# Patient Record
Sex: MALE | Employment: FULL TIME | URBAN - METROPOLITAN AREA
[De-identification: names, ages, dates, MRNs, and addresses within clinical notes are randomized per-mention and may not be internally consistent; named-entity substitution may affect disease eponyms.]

---

## 2024-04-12 ENCOUNTER — OFFICE VISIT (OUTPATIENT)
Dept: PRIMARY CARE | Facility: CLINIC | Age: 44
End: 2024-04-12
Payer: COMMERCIAL

## 2024-04-12 VITALS
OXYGEN SATURATION: 97 % | SYSTOLIC BLOOD PRESSURE: 128 MMHG | BODY MASS INDEX: 32.13 KG/M2 | DIASTOLIC BLOOD PRESSURE: 80 MMHG | RESPIRATION RATE: 16 BRPM | WEIGHT: 212 LBS | HEART RATE: 70 BPM | TEMPERATURE: 97.7 F | HEIGHT: 68 IN

## 2024-04-12 DIAGNOSIS — E03.9 HYPOTHYROIDISM, UNSPECIFIED TYPE: ICD-10-CM

## 2024-04-12 DIAGNOSIS — E78.2 MIXED HYPERLIPIDEMIA: Primary | ICD-10-CM

## 2024-04-12 PROCEDURE — 99214 OFFICE O/P EST MOD 30 MIN: CPT | Performed by: INTERNAL MEDICINE

## 2024-04-12 RX ORDER — VALSARTAN AND HYDROCHLOROTHIAZIDE 80; 12.5 MG/1; MG/1
1 TABLET, FILM COATED ORAL DAILY
COMMUNITY

## 2024-04-12 RX ORDER — SULFAMETHOXAZOLE AND TRIMETHOPRIM 800; 160 MG/1; MG/1
60 TABLET ORAL
COMMUNITY
Start: 2022-11-03

## 2024-04-12 RX ORDER — ROSUVASTATIN CALCIUM 10 MG/1
10 TABLET, COATED ORAL NIGHTLY
COMMUNITY

## 2024-04-12 ASSESSMENT — ENCOUNTER SYMPTOMS
BLOOD IN STOOL: 0
DIARRHEA: 1

## 2024-04-12 ASSESSMENT — PATIENT HEALTH QUESTIONNAIRE - PHQ9
1. LITTLE INTEREST OR PLEASURE IN DOING THINGS: NOT AT ALL
2. FEELING DOWN, DEPRESSED OR HOPELESS: NOT AT ALL
SUM OF ALL RESPONSES TO PHQ9 QUESTIONS 1 AND 2: 0

## 2024-04-12 NOTE — PROGRESS NOTES
Patient here to establish a new primary care doctor - discuss fatty liver, weight, precancer cell colon, HTN, HLD and GI issues, discuss thyroid    Subjective   Patient ID: Jose Sanz is a 43 y.o. male who presents for New Patient Visit.  He previously followed with  from Internal Medicine.  He is accompanied by his wife who offers some of the history.       The patient received one round of Ozempic, and was unable to continue with the medication due to issues with insurance coverage.  He tolerated the medication well at the time.  His last HbA1c was 5.1% in 11/2023.  His mother has a history of Diabetes Mellitus.    The patient is currently maintained with [place] Thyroid 60mg once daily in the mornings.      The patent reports bowel urgency and diarrhea, but denies any hematochezia or melena.  The last colonoscopy was in 9/2022, and will be due for repeat in 9/2027.    The patient is concerned about a diagnosis of hepatic steatosis.  He completed a Fibroscan which showed steatosis grade of S3 per the note from Gastroenterology.  He inquires about a referral to Nutrition Services.  The patient has been taking rosuvastatin 10mg once nightly for some time on a relatively consistent basis.    The patient recently started taking valsartan-hydrochlorothiazide 80-12.5 mg once daily in 11/2023, and has been tolerating the medication well.  His blood pressure in the office today was 128/80.    The patient does not smoke cigarettes, and drinks Non-Alcoholic Beer.  He is  and has young children.        Review of Systems   Gastrointestinal:  Positive for diarrhea. Negative for blood in stool.        Positive for bowel urgency.     Objective   Physical Exam  Constitutional:       Appearance: Normal appearance.   Neck:      Vascular: No carotid bruit.   Cardiovascular:      Rate and Rhythm: Normal rate and regular rhythm.      Heart sounds: Normal heart sounds.   Pulmonary:      Effort: Pulmonary effort is  normal.      Breath sounds: Normal breath sounds.   Abdominal:      General: Bowel sounds are normal.      Palpations: Abdomen is soft.      Tenderness: There is no abdominal tenderness.   Skin:     General: Skin is warm and dry.   Neurological:      General: No focal deficit present.      Mental Status: He is alert and oriented to person, place, and time. Mental status is at baseline.   Psychiatric:         Mood and Affect: Mood normal.         Behavior: Behavior normal.       Assessment/Plan   Problem List Items Addressed This Visit    None  Visit Diagnoses         Codes    Mixed hyperlipidemia    -  Primary E78.2    Relevant Orders    CT cardiac scoring wo IV contrast    Referral to Nutrition Services    Hypothyroidism, unspecified type     E03.9    Relevant Orders    Referral to Endocrinology            IMPRESSIONS/PLAN:     HLD  - Maintained with rosuvastatin 10mg once nightly.  Ordered CT Calcium Score to establish cardiac risk.    Bowel Urgency/Diarrhea  - Advised patient to start Metamucil supplementation as 0.5 teaspoon in glass of water once daily for the first week, to be increased to full teaspoon if necessary thereafter.     HTN  - /80 in the office today.  Maintained with valsartan-hydrochlorothiazide 80-12.5 mg once daily.    Hypothyroidism  - Maintained with armour thyroid 60mg QAM.  Ordered referral to Endocrinology, and patient's wife has contact information.    Hepatic Steatosis  - Last CMP showed liver enzymes wnl 11/2023.  Last Fibroscan showed steatosis grade of S3.  Following with  from Gastroenterology.  Also seen by  from Hepatology.  Ordered referral to Nutrition Services.      Health Maintenance  - Routine labs 11/2023, will repeat prior to next visit.  Last PSA wnl 11/2023.  Last colonoscopy 9/2022 with  from Gastroenterology, repeat due 9/2027.      Follow-up in 3 months, call sooner if needed.        Scribe Attestation  By signing my name below, ITahmina  Elian Gamez   attest that this documentation has been prepared under the direction and in the presence of DO. Tahmina Perez 04/12/24 8:39 AM

## 2024-06-17 ENCOUNTER — APPOINTMENT (OUTPATIENT)
Dept: RADIOLOGY | Facility: CLINIC | Age: 44
End: 2024-06-17
Payer: COMMERCIAL

## 2024-07-12 ENCOUNTER — APPOINTMENT (OUTPATIENT)
Dept: PRIMARY CARE | Facility: CLINIC | Age: 44
End: 2024-07-12
Payer: COMMERCIAL

## 2024-07-19 ENCOUNTER — APPOINTMENT (OUTPATIENT)
Dept: PRIMARY CARE | Facility: CLINIC | Age: 44
End: 2024-07-19
Payer: COMMERCIAL

## 2024-08-19 ENCOUNTER — HOSPITAL ENCOUNTER (OUTPATIENT)
Dept: RADIOLOGY | Facility: CLINIC | Age: 44
Discharge: HOME | End: 2024-08-19
Payer: COMMERCIAL

## 2024-08-19 DIAGNOSIS — E78.2 MIXED HYPERLIPIDEMIA: ICD-10-CM

## 2024-08-19 PROCEDURE — 75571 CT HRT W/O DYE W/CA TEST: CPT
